# Patient Record
Sex: FEMALE
[De-identification: names, ages, dates, MRNs, and addresses within clinical notes are randomized per-mention and may not be internally consistent; named-entity substitution may affect disease eponyms.]

---

## 2019-02-12 PROBLEM — Z00.00 ENCOUNTER FOR PREVENTIVE HEALTH EXAMINATION: Status: ACTIVE | Noted: 2019-02-12

## 2019-02-25 ENCOUNTER — RECORD ABSTRACTING (OUTPATIENT)
Age: 29
End: 2019-02-25

## 2019-02-25 DIAGNOSIS — Z78.9 OTHER SPECIFIED HEALTH STATUS: ICD-10-CM

## 2019-02-25 LAB — CYTOLOGY CVX/VAG DOC THIN PREP: NORMAL

## 2019-02-25 RX ORDER — FLUCONAZOLE 150 MG/1
150 TABLET ORAL
Refills: 0 | Status: ACTIVE | COMMUNITY

## 2019-03-15 ENCOUNTER — APPOINTMENT (OUTPATIENT)
Dept: OBGYN | Facility: CLINIC | Age: 29
End: 2019-03-15

## 2019-03-15 DIAGNOSIS — N76.0 ACUTE VAGINITIS: ICD-10-CM

## 2019-03-15 DIAGNOSIS — B96.89 ACUTE VAGINITIS: ICD-10-CM

## 2019-05-31 ENCOUNTER — APPOINTMENT (OUTPATIENT)
Dept: OBGYN | Facility: CLINIC | Age: 29
End: 2019-05-31

## 2020-08-06 ENCOUNTER — ASOB RESULT (OUTPATIENT)
Age: 30
End: 2020-08-06

## 2020-08-06 ENCOUNTER — APPOINTMENT (OUTPATIENT)
Dept: OBGYN | Facility: CLINIC | Age: 30
End: 2020-08-06
Payer: COMMERCIAL

## 2020-08-06 ENCOUNTER — TRANSCRIPTION ENCOUNTER (OUTPATIENT)
Age: 30
End: 2020-08-06

## 2020-08-06 PROCEDURE — 76830 TRANSVAGINAL US NON-OB: CPT

## 2020-09-17 ENCOUNTER — APPOINTMENT (OUTPATIENT)
Dept: OBGYN | Facility: CLINIC | Age: 30
End: 2020-09-17
Payer: COMMERCIAL

## 2020-09-17 DIAGNOSIS — N92.1 EXCESSIVE AND FREQUENT MENSTRUATION WITH IRREGULAR CYCLE: ICD-10-CM

## 2020-09-17 PROCEDURE — 99442: CPT

## 2020-09-17 NOTE — HISTORY OF PRESENT ILLNESS
[FreeTextEntry1] : 31yo P0 for Telehealth visit today/ pt preferred Telephonic.\par Pt has seen me since 2017/ but no recent visit in the last 2 years.\par She has had a couple months of spotting between periods.  She has not been taking OCP.  Her period is usually 5 days/ cycle length is about 28 days.  This occurred in 6/2020 then for the next 2 months.  Pt is not sexually active since 4/2020.  She took a pregnancy test which was negative.  She has gained some weight this year- about 30lbs.  She is planning to have gastric sleeve in January with [facility].\par \par She works from home currently.

## 2020-09-17 NOTE — PLAN
[FreeTextEntry1] : Discussed we need to do some lab testing.  Pt is getting labs drawn with bariatric- will have TSH checked b/c of weight gain and abnormal bleeding.\par Will get appointment for annual and pelvic sono with Megan at the same time.

## 2020-10-05 DIAGNOSIS — Z11.3 ENCOUNTER FOR SCREENING FOR INFECTIONS WITH A PREDOMINANTLY SEXUAL MODE OF TRANSMISSION: ICD-10-CM

## 2020-10-06 ENCOUNTER — ASOB RESULT (OUTPATIENT)
Age: 30
End: 2020-10-06

## 2020-10-06 ENCOUNTER — APPOINTMENT (OUTPATIENT)
Dept: OBGYN | Facility: CLINIC | Age: 30
End: 2020-10-06
Payer: COMMERCIAL

## 2020-10-06 VITALS
BODY MASS INDEX: 46.56 KG/M2 | HEIGHT: 62 IN | WEIGHT: 253 LBS | SYSTOLIC BLOOD PRESSURE: 112 MMHG | DIASTOLIC BLOOD PRESSURE: 70 MMHG

## 2020-10-06 DIAGNOSIS — Z01.419 ENCOUNTER FOR GYNECOLOGICAL EXAMINATION (GENERAL) (ROUTINE) W/OUT ABNORMAL FINDINGS: ICD-10-CM

## 2020-10-06 PROCEDURE — 99395 PREV VISIT EST AGE 18-39: CPT

## 2020-10-06 PROCEDURE — 76830 TRANSVAGINAL US NON-OB: CPT

## 2020-10-06 RX ORDER — METRONIDAZOLE 7.5 MG/G
0.75 GEL VAGINAL
Qty: 1 | Refills: 1 | Status: ACTIVE | COMMUNITY
Start: 2020-10-06 | End: 1900-01-01

## 2020-10-06 RX ORDER — DROSPIRENONE AND ETHINYL ESTRADIOL 0.02-3(28)
3-0.02 KIT ORAL
Qty: 1 | Refills: 11 | Status: ACTIVE | COMMUNITY
Start: 2020-10-06 | End: 1900-01-01

## 2020-10-06 NOTE — HISTORY OF PRESENT ILLNESS
[FreeTextEntry1] : 29yo P0 here for annual gyn.\par Periods have become irreg since June; she is bleeding consistently. almost daily.  Never heavy with clots.  She is willing to try anything to manage this bleeding.  Not currently sexually active.   Still getting worked up for gastric sleeve in 1/2021.  \par \par She works from [employer].  She lives with her family.

## 2020-10-29 ENCOUNTER — TRANSCRIPTION ENCOUNTER (OUTPATIENT)
Age: 30
End: 2020-10-29

## 2020-11-02 LAB
CANDIDA VAG CYTO: NOT DETECTED
CYTOLOGY CVX/VAG DOC THIN PREP: NORMAL
G VAGINALIS+PREV SP MTYP VAG QL MICRO: NOT DETECTED
HPV HIGH+LOW RISK DNA PNL CVX: NOT DETECTED
T VAGINALIS VAG QL WET PREP: NOT DETECTED

## 2020-12-11 ENCOUNTER — APPOINTMENT (OUTPATIENT)
Dept: OBGYN | Facility: CLINIC | Age: 30
End: 2020-12-11

## 2020-12-21 PROBLEM — N76.0 BACTERIAL VAGINOSIS: Status: RESOLVED | Noted: 2019-03-15 | Resolved: 2020-12-21

## 2020-12-23 PROBLEM — Z01.419 ENCOUNTER FOR GYNECOLOGICAL EXAMINATION WITHOUT ABNORMAL FINDING: Status: RESOLVED | Noted: 2019-05-31 | Resolved: 2020-12-23
